# Patient Record
Sex: MALE | Race: WHITE | NOT HISPANIC OR LATINO | URBAN - METROPOLITAN AREA
[De-identification: names, ages, dates, MRNs, and addresses within clinical notes are randomized per-mention and may not be internally consistent; named-entity substitution may affect disease eponyms.]

---

## 2024-04-13 ENCOUNTER — EMERGENCY (EMERGENCY)
Facility: HOSPITAL | Age: 89
LOS: 1 days | Discharge: NURSING, REHAB OR LONG-TERM CARE | End: 2024-04-13
Attending: EMERGENCY MEDICINE | Admitting: EMERGENCY MEDICINE
Payer: MEDICARE

## 2024-04-13 VITALS
RESPIRATION RATE: 17 BRPM | HEART RATE: 72 BPM | OXYGEN SATURATION: 94 % | SYSTOLIC BLOOD PRESSURE: 153 MMHG | DIASTOLIC BLOOD PRESSURE: 76 MMHG | TEMPERATURE: 98 F

## 2024-04-13 PROCEDURE — 99284 EMERGENCY DEPT VISIT MOD MDM: CPT

## 2024-04-13 RX ORDER — OLANZAPINE 15 MG/1
5 TABLET, FILM COATED ORAL ONCE
Refills: 0 | Status: COMPLETED | OUTPATIENT
Start: 2024-04-13 | End: 2024-04-13

## 2024-04-13 RX ORDER — OLANZAPINE 15 MG/1
5 TABLET, FILM COATED ORAL ONCE
Refills: 0 | Status: DISCONTINUED | OUTPATIENT
Start: 2024-04-13 | End: 2024-04-13

## 2024-04-13 RX ADMIN — OLANZAPINE 5 MILLIGRAM(S): 15 TABLET, FILM COATED ORAL at 21:33

## 2024-04-13 NOTE — ED ADULT TRIAGE NOTE - CHIEF COMPLAINT QUOTE
pt from [facility], for agitation and refusing medication. pt from [facility], for agitation and refusing medication. pts R thumb noted to be bruised and swollen. lac on bridge of nose.

## 2024-04-13 NOTE — ED ADULT NURSE REASSESSMENT NOTE - NS ED NURSE REASSESS COMMENT FT1
pt combative towards staff, attempted to punch staff at bedside. MD at bedside speaking to pt. pt attempting to climb out of stretcher, pt repositioned by staff. pt refusing oral medication, MD made aware. IM med administered via left deltoid. safety measures maintained, side rails up x2

## 2024-04-13 NOTE — ED PROVIDER NOTE - PROGRESS NOTE DETAILS
Erlin Arango MD (PGY3): Multiple attempts my SW to contact nursing supervisor at facility. Finally got in contact with supervisor. informed them that patient has been stable in ED, no epsiodes of aggresion, is calm cooperative and directable. he was seen in the ED yesterday at [facility] and evaluated. From an ED standpoint patient does not require work up and he can be evaluate dby the attending at the rehab center. No indication for emergent psych eval. Patient was amendable to taking his meds in ed as well. Discussed this in detail with the supervisor who refused to give me their name. They stated they want the patient to be evaluated in the ED until tomorrow. I informed them this is not indicated and patient can return to the facility. Supervisor continuously demanding patient stay despite me clarifying that there is no indication for work up today. I inform,ed supervisor if there is concern on patients return they can call EMS again. SW informed.

## 2024-04-13 NOTE — ED PROVIDER NOTE - OBJECTIVE STATEMENT
Patient is a 89 y/o M w/ hx of dementia a&ox1, hld, gout, ckd, mdd presenting to the ED w/ c/f agitation. Per patient he is unsure of why he is here. Spoke to Yani, nurse at facility. There was concern that patient has had increasing agitation at facility. He throws trays, is verbally aggressive with staff. Patient went to [facility] yesterday where is was evaluated after falling to the ground himself. Patient has always been upset at facility. RN states he refused med.  Patient calm and cooperative in triage and room 12. Patient is a 89 y/o M w/ hx of dementia a&ox1, hld, gout, ckd, mdd presenting to the ED w/ c/f agitation. Per patient he is unsure of why he is here. Spoke to Yani nurse at facility. There was concern that patient has had increasing agitation at facility. He throws trays, is verbally aggressive with staff. Patient went to [facility] yesterday where is was evaluated after falling to the ground himself. Patient has always been upset at facility. RN states he refused med.  Patient calm and cooperative in triage and room 12.    Attendin-year-old male presents with agitation at the nursing home.  He was sent over from the facility for psychiatric evaluation.  Patient has no psychiatric history no psychiatric complaints today.  He is calm and cooperative here in the emergency department.

## 2024-04-13 NOTE — ED PROVIDER NOTE - NSICDXPASTMEDICALHX_GEN_ALL_CORE_FT
PAST MEDICAL HISTORY:  Acute appendicitis     Anxiety     Depression     Elevated PSA     Gastric ulcer 12/2013    Hypertension     Leg edema

## 2024-04-13 NOTE — ED PROVIDER NOTE - PHYSICAL EXAMINATION
General: Alert and Orientated x 2. No apparent distress.  Head: scabbed nasal brduge laceration. Small hematoma on L forehead  Eyes: PERRLA with EOMI.  Neck: Supple. Trachea midline.   Cardiac: Normal S1 and S2 w/ RRR. No murmurs appreciated. No JVD appreciated.  Pulmonary: CTA bilaterally. No increased WOB. No wheezes or crackles.  Abdominal: Soft, non-tender. (+) bowel sounds appreciated in all 4 quadrants. No hepatosplenomegaly.   Neurologic: No focal sensory or motor deficits.  Musculoskeletal: Strength appropriate in all 4 extremities for age with no limited ROM.  Skin: Color appropriate for race. Intact, warm, and well-perfused.  Psychiatric: Appropriate mood and affect. No apparent risk to self or others.

## 2024-04-13 NOTE — ED PROVIDER NOTE - CLINICAL SUMMARY MEDICAL DECISION MAKING FREE TEXT BOX
89 y/o M w/ hx of dementia a&ox1, hld, gout, ckd, mdd presenting to the ED w/ c/f agitation. Vebrally aggressive facility. In ed patient is calm and cooperative. not aggressive. Vitals stable. will give dose of zyprexa. will reassess. will speak to supervisor to clarify return for patient. no emergent concern for psych eval. likely discharge.

## 2024-04-13 NOTE — ED ADULT NURSE NOTE - OBJECTIVE STATEMENT
hx of dementia. pt sent from rehab facility d/t agitation and refusing medication. pt resting in stretcher in NAD, A&OX2, RR even and unlabored. pt reoriented place and situation. pt denies pain at this time, states wants to go home. pt is poor historian. able to verbally de-escalate pt agitation. safety measures maintained, side rails up x2. awaiting MD lind.

## 2024-04-13 NOTE — ED ADULT TRIAGE NOTE - NS_BH TRG QUESTION2_ED_ALL_ED
Combative/assaultive * IN THE PAST WEEK * Combative/assaultive * IN THE ED * (e.g. attacking staff) Combative/assaultive * PRIOR TO ARRIVAL *

## 2024-04-13 NOTE — ED ADULT NURSE NOTE - CHIEF COMPLAINT QUOTE
pt from [facility], for agitation and refusing medication. pts R thumb noted to be bruised and swollen. lac on bridge of nose.

## 2024-04-13 NOTE — ED ADULT NURSE NOTE - NSFALLHARMRISKINTERV_ED_ALL_ED

## 2024-04-13 NOTE — ED PROVIDER NOTE - NSFOLLOWUPINSTRUCTIONS_ED_ALL_ED_FT
You were seen in the Emergency Department for concern for agitation. Patient has been CALM AND COOPERATIVE IN ED. He took his dose of zyprexa without issue. There was no immediate concern require admission or psychiatric evaluation.     Follow up with your doctor in 1 week - bring copies of your results if you were given. If you do not have a primary doctor, please call [phone] to find one convenient for you.    Continue all prescribed medications.     Return to ED for any new or worsening symptoms including but not limited to: development of chest pain, shortness of breath, fever, vomiting, focal numbness, weakness or tingling, any severe CP, headache, abdominal pain, back pain.      Rest and keep yourself hydrated with fluids.

## 2024-04-13 NOTE — ED PROVIDER NOTE - PATIENT PORTAL LINK FT
You can access the FollowMyHealth Patient Portal offered by [facility] by registering at the following website: [email or web address]. By joining [facility]’s FollowMyHealth portal, you will also be able to view your health information using other applications (apps) compatible with our system.

## 2024-04-13 NOTE — CHART NOTE - NSCHARTNOTEFT_GEN_A_CORE
LMSW contacted by MD regarding 89yr old male patient presented to ED from [facility] ([phone]). Per MD patient medically cleared to return to SNF. LMSW called and spoke with Nurse Manager Yani who reports that LMSW would need to speak with Nursing Supervisor regarding patient returning tonight and transferred call. LMSW put on hold and then to voicemail. Attempted x2 to contact facility and speak with Nursing Supervisor but voicemail received. SW to continue with efforts. LMSW contacted by MD regarding 89yr old male patient presented to ED from - [facility] ([phone]). Per MD patient medically cleared to return to SNF. LMSW called and spoke with Nurse Yani who reports that LMSW would need to speak with Nursing Supervisor regarding patient returning tonight and transferred call. LMSW put on hold and then to voicemail. Attempted x2 to contact facility and speak with Nursing Supervisor but voicemail received. SW to continue with efforts. LMSW contacted by MD regarding 89yr old male patient presented to ED from [facility] ([phone]). Per MD patient medically cleared to return to SNF. LMSW called and spoke with Nurse Yani who reports that LMSW would need to speak with Nursing Supervisor regarding patient returning tonight and transferred call. LMSW put on hold for 15 mins and then call sent to voicemail. Attempted x2 to contact facility and speak with Nursing Supervisor but only voicemail reached, message left. SW to continue with efforts.

## 2024-04-13 NOTE — ED PROVIDER NOTE - NSICDXPASTSURGICALHX_GEN_ALL_CORE_FT
PAST SURGICAL HISTORY:  S/P CABG (coronary artery bypass graft) X4 2004    Status post tonsillectomy and adenoidectomy childhood

## 2024-04-14 VITALS
SYSTOLIC BLOOD PRESSURE: 193 MMHG | TEMPERATURE: 98 F | RESPIRATION RATE: 18 BRPM | HEART RATE: 66 BPM | DIASTOLIC BLOOD PRESSURE: 92 MMHG | OXYGEN SATURATION: 96 %

## 2024-04-14 NOTE — ED ADULT NURSE REASSESSMENT NOTE - NS ED NURSE REASSESS COMMENT FT1
[facility] arrived for pt transport back to facility. report given to EMS. vitals as documented. ED attending MD and resident MD made aware of pt hypertension, pt cleared for continued discharge, no further orders at this time. pt remains A&OX2 at baseline, denies headache or blurry vision. pt safely transferred to EMS stretcher in NAD, RR even and unlabored. all belongings with pt. paperwork received by EMS.